# Patient Record
Sex: MALE | Employment: STUDENT | URBAN - METROPOLITAN AREA
[De-identification: names, ages, dates, MRNs, and addresses within clinical notes are randomized per-mention and may not be internally consistent; named-entity substitution may affect disease eponyms.]

---

## 2023-01-28 ENCOUNTER — OFFICE VISIT (OUTPATIENT)
Dept: ORTHOPEDIC SURGERY | Age: 43
End: 2023-01-28

## 2023-01-28 VITALS — BODY MASS INDEX: 26.52 KG/M2 | HEIGHT: 66 IN | WEIGHT: 165 LBS

## 2023-01-28 DIAGNOSIS — M54.2 NECK PAIN: ICD-10-CM

## 2023-01-28 DIAGNOSIS — M25.512 ACUTE PAIN OF LEFT SHOULDER: Primary | ICD-10-CM

## 2023-01-28 DIAGNOSIS — M54.12 CERVICAL RADICULAR PAIN: ICD-10-CM

## 2023-01-28 DIAGNOSIS — M50.30 DDD (DEGENERATIVE DISC DISEASE), CERVICAL: ICD-10-CM

## 2023-01-28 RX ORDER — TIZANIDINE 4 MG/1
4 TABLET ORAL 4 TIMES DAILY PRN
Qty: 60 TABLET | Refills: 0 | Status: SHIPPED | OUTPATIENT
Start: 2023-01-28

## 2023-01-28 RX ORDER — METHYLPREDNISOLONE 4 MG/1
TABLET ORAL
Qty: 1 KIT | Refills: 0 | Status: SHIPPED | OUTPATIENT
Start: 2023-01-28

## 2023-01-28 RX ORDER — IBUPROFEN 600 MG/1
600 TABLET ORAL EVERY 8 HOURS PRN
Qty: 90 TABLET | Refills: 1 | Status: SHIPPED | OUTPATIENT
Start: 2023-01-28 | End: 2023-03-29

## 2023-01-28 NOTE — PROGRESS NOTES
New Patient: CERVICAL SPINE    Referring Provider:  No ref. provider found    CHIEF COMPLAINT:    Chief Complaint   Patient presents with    New Patient     Left shoulder       HISTORY OF PRESENT ILLNESS:   Mr. Naveen Mccauley is a pleasant 43 y.o. old male here in the [address] [place] [address] for consultation regarding his neck and left arm pain. He states the pain began rather suddenly upon awakening about 1 month ago. His pain has steadily worsened since then. He rates his neck pain 810 VAS and shoulder and arm pain 8/10 VAS. He describes the pain as aching, throbbing from his lateral neck down to his thumb and index finger. Pain is worst with activity and improved some with change in position. The arm pain radiates to his left hand. He reports numbness and tingling in a C6 distribution. He reports weakness of his left arm. Patient denies difficulty with fine motor skills. He denies lower extremity symptoms, gait abnormality, saddle numbness and bowel or bladder dysfunction. The pain does interfere with his sleep. Current/Past Treatment:   Physical Therapy: None. Chiropractic: None. Injection: None. Medications: Over-the-counter anti-inflammatory medications without relief. Past Medical History:   No past medical history on file. Past Surgical History:     No past surgical history on file. Current Medications:     Current Outpatient Medications:     methylPREDNISolone (MEDROL, ADAM,) 4 MG tablet, Take by mouth. 6 po day one 5 po day 2 4 po day 3 3 po day 4 2 po day 5 1 po day 6., Disp: 1 kit, Rfl: 0    tiZANidine (ZANAFLEX) 4 MG tablet, Take 1 tablet by mouth 4 times daily as needed (spasm), Disp: 60 tablet, Rfl: 0    ibuprofen (ADVIL;MOTRIN) 600 MG tablet, Take 1 tablet by mouth every 8 hours as needed for Pain Do not start until Medrol dose pack completed, Disp: 90 tablet, Rfl: 1    Allergies:  Patient has no known allergies.     Social History: reports that he has been smoking cigarettes. He has never used smokeless tobacco.    Family History:   No family history on file. Review of Systems:  I have reviewed the clinically relevant past medical history, medications, allergies, family history, social history, and 13 point Review of Systems from the patient's recent history form & documented any details relevant to today's presenting complaints in the history above. The patient's self-reported past medical history, medications, allergies, family history, social history, and Review of Systems form from today's date have been scanned into the chart under the \"Media\" tab. PHYSICAL EXAM:    Vitals: Height 5' 6\" (1.676 m), weight 165 lb (74.8 kg). GENERAL EXAM:  General Apparence: Patient is adequately groomed with no evidence of malnutrition. Orientation: The patient is oriented to time, place and person. Mood & Affect:The patient's mood and affect are appropriate   Vascular: Examination reveals no swelling tenderness in upper or lower extremities. Good capillary refill  Lymphatic: The lymphatic examination bilaterally reveals all areas to be without enlargement or induration  Sensation: Sensation is intact without deficit  Coordination/Balance: Good coordination. Tandem walking normal.     CERVICAL EXAMINATION:  Inspection: Local inspection shows no step-off or bruising. Cervical alignment is normal.     Palpation: No evidence of tenderness at the midline, and trapezius. Paraspinal tenderness is present. There is no step-off or paraspinal spasm. Range of Motion: Cervical flexion, extension, and rotation are mildly reduced with pain. Strength: 5/5 bilateral upper extremities   Special Tests:     Spurling's negative & Carpenter's negative bilaterally. Cubital and Carpal tunnel Tinel's negative bilaterally. Skin:There are no rashes, ulcerations or lesions in right & left upper extremities.   Reflexes: Bilaterally triceps, biceps and brachioradialis are 2+. Clonus absent bilaterally at the feet. Gait & station: normal, patient ambulates without assistance. Additional Examinations:       RIGHT UPPER EXTREMITY:  Inspection/examination of the right upper extremity does not show any tenderness, deformity or injury. Range of motion is unremarkable. There is no gross instability. There are no rashes, ulcerations or lesions. Strength and tone are normal.  LEFT UPPER EXTREMITY: Inspection/examination of the left upper extremity does not show any tenderness, deformity or injury. Range of motion is unremarkable. There is no gross instability. There are no rashes, ulcerations or lesions. Strength and tone are normal.    Diagnostic Testing:  AP and lateral cervical spine x-rays as well as AP axillary and Y views of the left shoulder were obtained today. X-rays show moderate degenerative disc disease C5-6. Left shoulder x-rays unremarkable. 1. Acute pain of left shoulder    2. Neck pain    3. DDD (degenerative disc disease), cervical    4. Cervical radicular pain          Impression:   49-year-old gentleman who awoke with stiffness of the neck 1 month ago with progressive left upper extremity radicular symptoms. X-rays show degenerative disc disease at C5-C6. He reports numbness and tingling into the thumb and index finger. He is neurologically intact in the upper extremity. He has a normal left shoulder examination. I had an extensive discussion with Mr. Jones Desouza and/or family regarding the natural history, etiology, and long term consequences of his condition. I have presented reasonable alternatives to the patient's proposed care, treatment, and services. Risks and benefits of the treatment options also reviewed in detail. I have outlined a treatment plan with them. He has had full opportunity to ask his questions. I have answered them all to his satisfaction.   I feel that Mr. Jones Desouza understands our discussion today. Medications prescribed today:   Medrol Dosepak 4 mg tablets. Tizanidine 4 mg tablets to be taken every 8 hours for muscle spasm. Ibuprofen 600 mg 3 times daily to be started after Medrol Dosepak completed. PT-referral to outpatient therapy provided today. Further Imaging-consider cervical spine MRI if symptoms fail to improve with conservative treatment. Procedures-no surgical indication at this time. Follow up-3-4 weeks. Call or return to clinic if these symptoms worsen or fail to improve as anticipated. The total time spent on today's visit including reviewing test results, history, performance of physical exam, counseling/ education, ordering of medications, tests or procedures was 35 minutes. This time does include completion of the medical record. This time excludes any time spent performing procedures or tests in the office. Sari Huff PA-C   Senior Physician Assistant   [facility]                                         Disclaimer: This note was generated with use of a verbal recognition program (DRAGON) and an attempt was made to check for errors. It is possible that there are still dictated errors within this office note. If so, please bring any significant errors to my attention for an addendum. All efforts were made to ensure that this office note is accurate.

## 2023-02-13 ENCOUNTER — HOSPITAL ENCOUNTER (EMERGENCY)
Age: 43
Discharge: HOME | End: 2023-02-13
Payer: COMMERCIAL

## 2023-02-13 VITALS
HEART RATE: 80 BPM | RESPIRATION RATE: 16 BRPM | DIASTOLIC BLOOD PRESSURE: 74 MMHG | WEIGHT: 180 LBS | TEMPERATURE: 98.2 F | BODY MASS INDEX: 29.05 KG/M2 | SYSTOLIC BLOOD PRESSURE: 122 MMHG | OXYGEN SATURATION: 99 %

## 2023-02-13 DIAGNOSIS — M54.2 NECK PAIN: Primary | ICD-10-CM

## 2023-02-13 PROCEDURE — 2500000003 HC RX 250 WO HCPCS: Performed by: PHYSICIAN ASSISTANT

## 2023-02-13 PROCEDURE — 6360000002 HC RX W HCPCS: Performed by: PHYSICIAN ASSISTANT

## 2023-02-13 PROCEDURE — 6370000000 HC RX 637 (ALT 250 FOR IP): Performed by: PHYSICIAN ASSISTANT

## 2023-02-13 PROCEDURE — 96372 THER/PROPH/DIAG INJ SC/IM: CPT

## 2023-02-13 PROCEDURE — 99284 EMERGENCY DEPT VISIT MOD MDM: CPT

## 2023-02-13 RX ORDER — BUPIVACAINE HYDROCHLORIDE 5 MG/ML
30 INJECTION, SOLUTION EPIDURAL; INTRACAUDAL ONCE
Status: COMPLETED | OUTPATIENT
Start: 2023-02-13 | End: 2023-02-13

## 2023-02-13 RX ORDER — HYDROCODONE BITARTRATE AND ACETAMINOPHEN 5; 325 MG/1; MG/1
1 TABLET ORAL ONCE
Status: COMPLETED | OUTPATIENT
Start: 2023-02-13 | End: 2023-02-13

## 2023-02-13 RX ORDER — LIDOCAINE 4 G/G
1 PATCH TOPICAL ONCE
Status: DISCONTINUED | OUTPATIENT
Start: 2023-02-13 | End: 2023-02-13 | Stop reason: HOSPADM

## 2023-02-13 RX ORDER — TRIAMCINOLONE ACETONIDE 40 MG/ML
80 INJECTION, SUSPENSION INTRA-ARTICULAR; INTRAMUSCULAR ONCE
Status: COMPLETED | OUTPATIENT
Start: 2023-02-13 | End: 2023-02-13

## 2023-02-13 RX ORDER — LIDOCAINE 4 G/G
1 PATCH TOPICAL DAILY
Qty: 30 PATCH | Refills: 0 | Status: SHIPPED | OUTPATIENT
Start: 2023-02-13 | End: 2023-03-15

## 2023-02-13 RX ADMIN — BUPIVACAINE HYDROCHLORIDE 150 MG: 5 INJECTION, SOLUTION EPIDURAL; INTRACAUDAL at 13:05

## 2023-02-13 RX ADMIN — TRIAMCINOLONE ACETONIDE 80 MG: 40 INJECTION, SUSPENSION INTRA-ARTICULAR; INTRAMUSCULAR at 13:05

## 2023-02-13 RX ADMIN — HYDROCODONE BITARTRATE AND ACETAMINOPHEN 1 TABLET: 5; 325 TABLET ORAL at 12:06

## 2023-02-13 ASSESSMENT — ENCOUNTER SYMPTOMS
NAUSEA: 0
SHORTNESS OF BREATH: 0
VOMITING: 0
COLOR CHANGE: 0
COUGH: 0

## 2023-02-13 ASSESSMENT — PAIN SCALES - GENERAL
PAINLEVEL_OUTOF10: 9
PAINLEVEL_OUTOF10: 6
PAINLEVEL_OUTOF10: 6
PAINLEVEL_OUTOF10: 10

## 2023-02-13 ASSESSMENT — PAIN DESCRIPTION - LOCATION: LOCATION: NECK

## 2023-02-13 ASSESSMENT — PAIN - FUNCTIONAL ASSESSMENT: PAIN_FUNCTIONAL_ASSESSMENT: 0-10

## 2023-02-13 ASSESSMENT — PAIN DESCRIPTION - ORIENTATION: ORIENTATION: LEFT

## 2023-02-13 NOTE — Clinical Note
Felisa Cooks was seen and treated in our emergency department on 2/13/2023. He may return to work on 02/15/2023. LIGHT DUTY x 2 weeks; no lifting/pushing/pulling anything greater than 5 lbs until cleared by orthopedics. If you have any questions or concerns, please don't hesitate to call.       Tee Escobedo

## 2023-02-13 NOTE — ED PROVIDER NOTES
[address]        Pt Name: Zahraa Ford  MRN: [id number]  [place] 1980  Date of evaluation: 2/13/2023  Provider: CARLOS Dockery  PCP: PROVIDER Jonny Fine MD  Note Started: 1:08 PM EST 2/13/23      MARCO. I have evaluated this patient. My supervising physician was available for consultation. CHIEF COMPLAINT       Chief Complaint   Patient presents with    Neck Pain     Neck pain for two months. Left side. Denies any other pain. HISTORY OF PRESENT ILLNESS: 1 or more Elements     History From: patient  Limitations to history : None    Zahraa Ford is a 43 y.o. male who presents to the emergency department today with complaints of left-sided neck pain. Patient reports that the symptoms has been present for the last 2 months. The symptoms have been getting worse. He did see a PA at orthopedics, and was started on relaxers and steroids. He states that they helped very briefly, but did not completely save the symptoms. Since stopping his medications his symptoms have returned with a vengeance. Last night he went to [facility] emergency room due to the severity of the pain. He presents today for further evaluation and management. He states that he has not yet tried calling the PA he saw with orthopedics, he has an appointment for follow-up next week. He has no further complaints. Nursing Notes were all reviewed and agreed with or any disagreements were addressed in the HPI. REVIEW OF SYSTEMS :      Review of Systems   Constitutional:  Negative for chills and fever. Respiratory:  Negative for cough and shortness of breath. Cardiovascular:  Negative for chest pain and palpitations. Gastrointestinal:  Negative for nausea and vomiting. Musculoskeletal:  Positive for myalgias and neck pain. Skin:  Negative for color change and rash. Neurological:  Negative for syncope and weakness. Psychiatric/Behavioral:  Negative for agitation and confusion. Positives and Pertinent negatives as per HPI. SURGICAL HISTORY   No past surgical history on file. [address]       Discharge Medication List as of 2/13/2023  1:18 PM        CONTINUE these medications which have NOT CHANGED    Details   methylPREDNISolone (MEDROL, ADAM,) 4 MG tablet Take by mouth. 6 po day one 5 po day 2 4 po day 3 3 po day 4 2 po day 5 1 po day 6., Disp-1 kit, R-0Normal      tiZANidine (ZANAFLEX) 4 MG tablet Take 1 tablet by mouth 4 times daily as needed (spasm), Disp-60 tablet, R-0Normal      ibuprofen (ADVIL;MOTRIN) 600 MG tablet Take 1 tablet by mouth every 8 hours as needed for Pain Do not start until Medrol dose pack completed, Disp-90 tablet, R-1Normal             ALLERGIES     Patient has no known allergies. FAMILYHISTORY     No family history on file. SOCIAL HISTORY       Social History     Tobacco Use    Smoking status: Every Day     Types: Cigarettes    Smokeless tobacco: Never       SCREENINGS        Manny Coma Scale  Eye Opening: Spontaneous  Best Verbal Response: Oriented  Best Motor Response: Obeys commands  Pepin Coma Scale Score: 15                CIWA Assessment  BP: 122/74  Heart Rate: 80           PHYSICAL EXAM  1 or more Elements     ED Triage Vitals   BP Temp Temp Source Heart Rate Resp SpO2 Height Weight   02/13/23 1131 02/13/23 1131 02/13/23 1131 02/13/23 1131 02/13/23 1131 02/13/23 1131 -- 02/13/23 1132   126/81 98.2 °F (36.8 °C) Oral 89 17 98 %  180 lb (81.6 kg)       Physical Exam  Vitals and nursing note reviewed. Constitutional:       General: He is not in acute distress. Appearance: Normal appearance. He is well-developed. He is not ill-appearing, toxic-appearing or diaphoretic. HENT:      Head: Normocephalic and atraumatic. Mouth/Throat:      Mouth: Mucous membranes are moist.      Pharynx: Oropharynx is clear.    Eyes:      Conjunctiva/sclera: Conjunctivae normal.      Pupils: Pupils are equal, round, and reactive to light. Neck:     Cardiovascular:      Pulses: Normal pulses. Pulmonary:      Effort: Pulmonary effort is normal. No respiratory distress. Breath sounds: Normal breath sounds. Musculoskeletal:      Cervical back: Tenderness present. No rigidity or crepitus. Pain with movement present. Skin:     General: Skin is warm and dry. Neurological:      Mental Status: He is alert. Psychiatric:         Behavior: Behavior normal. Behavior is cooperative. DIAGNOSTIC RESULTS   LABS:    Labs Reviewed - No data to display    When ordered only abnormal lab results are displayed. All other labs were within normal range or not returned as of this dictation. EKG: When ordered, EKG's are interpreted by the Emergency Department Physician in the absence of a cardiologist.  Please see their note for interpretation of EKG. RADIOLOGY:   Non-plain film images such as CT, Ultrasound and MRI are read by the radiologist. Plain radiographic images are visualized and preliminarily interpreted by the ED Provider with the below findings:    Interpretation per the Radiologist below, if available at the time of this note:    No orders to display     No results found. No results found. PROCEDURES   Unless otherwise noted below, none     Procedures    PAST MEDICAL HISTORY      has no past medical history on file.      EMERGENCY DEPARTMENT COURSE and DIFFERENTIAL DIAGNOSIS/MDM:   Vitals:    Vitals:    02/13/23 1131 02/13/23 1132 02/13/23 1315   BP: 126/81  122/74   Pulse: 89  80   Resp: 17  16   Temp: 98.2 °F (36.8 °C)     TempSrc: Oral     SpO2: 98%  99%   Weight:  180 lb (81.6 kg)        Patient was given the following medications:  Medications   HYDROcodone-acetaminophen (NORCO) 5-325 MG per tablet 1 tablet (1 tablet Oral Given 2/13/23 1206)   triamcinolone acetonide (KENALOG-40) injection 80 mg (80 mg IntraMUSCular Given 2/13/23 1305)   bupivacaine (PF) (MARCAINE) 0.5 % injection 150 mg (150 mg IntraDERmal Given by Other 2/13/23 0055)             Is this patient to be included in the SEP-1 Core Measure due to severe sepsis or septic shock? No   Exclusion criteria - the patient is NOT to be included for SEP-1 Core Measure due to: Infection is not suspected    Chronic Conditions affecting care:    has no past medical history on file. CONSULTS: (Who and What was discussed)  IP CONSULT TO ORTHOPEDIC SURGERY      Social Determinants Significantly Affecting Health : None    Records Reviewed (External and Source) EMR    CC/HPI Summary, DDx, ED Course, and Reassessment: This is a 43year old male ho presents to the emergency department today with complaints of left-sided neck pain. Patient reports that the symptoms has been present for the last 2 months. The symptoms have been getting worse. He did see a PA at orthopedics, and was started on relaxers and steroids. He states that they helped very briefly, but did not completely save the symptoms. Since stopping his medications his symptoms have returned with a vengeance. Last night he went to [facility] emergency room due to the severity of the pain. He presents today for further evaluation and management. He states that he has not yet tried calling the PA he saw with orthopedics, he has an appointment for follow-up next week. - Vital signs were reviewed. Exam as above. - Pertinent Labs & Imaging studies reviewed. (See chart for details)   -  Patient seen and evaluated in the emergency department. -  Triage and nursing notes reviewed and incorporated. -  Old chart records reviewed and incorporated. -  MARCO. I have evaluated this patient. My supervising physician was available for consultation.  -  Differential diagnosis includes: Radiculopathy, degenerative changes, arthritis, muscle spasm, other  -  Work-up included:  See above  -  ED treatment included: Norco, lidocaine patch.   Was also treated in the emergency department with a trigger point injection performed by orthopedic NP Nell. - Consults: Ortho - spoke with Adamaris Marrero, who kindly came to the emergency department today to evaluate the patient, and perform a trigger point injection which did provide some improvement to his symptoms. She also was able to facilitate communication between the PA that saw him previously to arrange for short interval follow-up, and possible arrangement of an MRI in the interim before she follows up with CARLOS Awan.  -  Results discussed with patient and/or family. Patient feels improvement. At this time, we recommend discharge, as the patient is stable for outpatient management. The patient and/or family is agreeable with plan of care and disposition.  -  Disposition: Home in stable can  - Critical Care:  0 minutes      FINAL IMPRESSION      1. Neck pain          DISPOSITION/PLAN     DISPOSITION Decision To Discharge 02/13/2023 01:09:07 PM      PATIENT REFERRED TO:  Zuleika Bazan, [facility].   [facility] [address] [place] [address]  [phone]    Schedule an appointment as soon as possible for a visit       [facility] Emergency Department  [address] [place] [address]  [phone]    If symptoms worsen      DISCHARGE MEDICATIONS:  Discharge Medication List as of 2/13/2023  1:18 PM        START taking these medications    Details   lidocaine 4 % external patch Place 1 patch onto the skin daily, TransDERmal, DAILY Starting Mon 2/13/2023, Until Wed 3/15/2023, For 30 days, Disp-30 patch, R-0, Normal             DISCONTINUED MEDICATIONS:  Discharge Medication List as of 2/13/2023  1:18 PM                 (Please note that portions of this note were completed with a voice recognition program.  Efforts were made to edit the dictations but occasionally words are mis-transcribed.)    CARLOS Keith (electronically signed)        Tee Keith  02/13/23 2100

## 2023-02-13 NOTE — DISCHARGE INSTRUCTIONS
Call CARLOS Mas's office today to make an appt for tomorrow to arrange for MRI    Light duty at work until cleared by [employer]    Return with any new or worrisome symptoms

## 2023-02-14 ENCOUNTER — TELEPHONE (OUTPATIENT)
Dept: ORTHOPEDIC SURGERY | Age: 43
End: 2023-02-14

## 2023-02-14 DIAGNOSIS — M54.12 CERVICAL RADICULAR PAIN: Primary | ICD-10-CM

## 2023-02-14 NOTE — TELEPHONE ENCOUNTER
General Question     Subject: patient call and would like to know if he can get his Mri Order put in so he can get that schedule at the [facility] he would like a call back when that is done so he can schedule.  Please Advise   Patient Jay Mccarthy  Contact Number: [phone]

## 2023-02-21 ENCOUNTER — OFFICE VISIT (OUTPATIENT)
Dept: ORTHOPEDIC SURGERY | Age: 43
End: 2023-02-21
Payer: COMMERCIAL

## 2023-02-21 VITALS — WEIGHT: 180 LBS | BODY MASS INDEX: 28.93 KG/M2 | HEIGHT: 66 IN | RESPIRATION RATE: 16 BRPM

## 2023-02-21 DIAGNOSIS — M54.12 CERVICAL RADICULOPATHY AT C6: ICD-10-CM

## 2023-02-21 DIAGNOSIS — M50.30 DDD (DEGENERATIVE DISC DISEASE), CERVICAL: Primary | ICD-10-CM

## 2023-02-21 PROCEDURE — 99214 OFFICE O/P EST MOD 30 MIN: CPT | Performed by: PHYSICIAN ASSISTANT

## 2023-02-21 RX ORDER — IBUPROFEN 600 MG/1
600 TABLET ORAL EVERY 8 HOURS PRN
Qty: 90 TABLET | Refills: 1 | Status: SHIPPED | OUTPATIENT
Start: 2023-02-21 | End: 2023-04-22

## 2023-02-21 RX ORDER — GABAPENTIN 100 MG/1
100 CAPSULE ORAL 3 TIMES DAILY
Qty: 90 CAPSULE | Refills: 0 | Status: SHIPPED | OUTPATIENT
Start: 2023-02-21 | End: 2023-03-23

## 2023-02-21 RX ORDER — TIZANIDINE 4 MG/1
4 TABLET ORAL 4 TIMES DAILY PRN
Qty: 60 TABLET | Refills: 0 | Status: SHIPPED | OUTPATIENT
Start: 2023-02-21

## 2023-02-21 NOTE — PROGRESS NOTES
Subjective:      Patient ID: John Beyer is a 43 y.o. male who is here for follow up evaluation of left arm radicular pain. Scot Hyde He was initially seen at [facility] orthopedic after-hours clinic by myself on 1/28/2023 with chief complaint of left shoulder pain. At that time felt to be cervical radicular nature. He was prescribed Zanaflex for muscle spasm, Medrol Dosepak and was referred to physical therapy. Since that visit he has been to 2 different ERs for increased left posterior shoulder pain, left arm pain and numbness and tingling left upper extremity whole hand. He reports no significant improvement with the oral steroids and muscle relaxer. He has not attempted therapy due to work schedule. He did receive a trigger point steroid injection in the left trapezial region on 2/13/2023 with mild temporary relief. Review Of Systems:   Negative for fever or chills. Reports weakness in left upper extremity. Denies gait or balance disturbance. History reviewed. No pertinent past medical history. History reviewed. No pertinent family history. History reviewed. No pertinent surgical history. Social History     Occupational History    Not on file   Tobacco Use    Smoking status: Every Day     Types: Cigarettes    Smokeless tobacco: Never   Substance and Sexual Activity    Alcohol use: Not on file    Drug use: Not on file    Sexual activity: Not on file       Current Outpatient Medications   Medication Sig Dispense Refill    tiZANidine (ZANAFLEX) 4 MG tablet Take 1 tablet by mouth 4 times daily as needed (spasm) 60 tablet 0    gabapentin (NEURONTIN) 100 MG capsule Take 1 capsule by mouth 3 times daily for 30 days. Intended supply: 30 days 90 capsule 0    ibuprofen (ADVIL;MOTRIN) 600 MG tablet Take 1 tablet by mouth every 8 hours as needed for Pain 90 tablet 1    lidocaine 4 % external patch Place 1 patch onto the skin daily 30 patch 0    methylPREDNISolone (MEDROL, ADAM,) 4 MG tablet Take by mouth. 6 po day one 5 po day 2 4 po day 3 3 po day 4 2 po day 5 1 po day 6. 1 kit 0     No current facility-administered medications for this visit. Objective:     He is alert, oriented x 3, pleasant, well nourished, developed and in no   acute distress. Resp 16   Ht 5' 6\" (1.676 m)   Wt 180 lb (81.6 kg)   BMI 29.05 kg/m²      Cervical Spine Exam:  There is not deformity. There is  loss of motion. There is  muscular spasm. There is  trapezius/ rhomboid tenderness. There is not spinous process tenderness. Spurling Test is Positive. Upper extremities:  He has weakness 4/5 of the left upper extremity including left  strength testing, wrist extensor and shoulder abductor. DTR 2+ at the biceps, triceps and brachioradialis. He has no clonus and negative Carpenter's bilaterally. Negative Tinel's at the left wrist.  Negative Tinel's at the left elbow. Examination of the upper extremities shows intact perfusion to all extremities. He has no cyanosis and digigts are warm to touch, capillary refill is less than 2 seconds. He has no edema noted. He has intact skin without lacerations or abrasions, no significant erythema, rashes or skin lesions. X Rays: not performed in the office today:   X-ray cervical spine reviewed from 1/28/2023. Moderate degenerative disc disease at C5-C6. Mild degenerative disc disease at C4-C5 as well as C6-C7. Diagnosis:       ICD-10-CM    1. DDD (degenerative disc disease), cervical  M50.30 Ambulatory referral to Physical Therapy      2. Cervical radiculopathy at C6  M54.12 Ambulatory referral to Physical Therapy           Assessment and Plan:       Assessment: This is a 55-year-old male with a left upper extremity cervical radiculopathy and x-rays showing moderate degenerative disc disease at C5-6. Mild degenerative changes at C4-5 and C6-7. No improvement with recent Medrol Dosepak. Minimal improvement with previous trigger point injection.   He has been to the ER on 2 occasions since my initial visit for continued left arm pain. I had an extensive discussion with Mr. Nicole Guevara regarding the natural history, etiology, and long term consequences of his condition. I have presented reasonable alternatives to the patient's proposed care, treatment, and services. Risks and benefits of the treatment options also reviewed in detail. I have outlined a treatment plan with them. He has had full opportunity to ask his questions. I have answered them all to his satisfaction. I feel that Mr. Nicole Guevara understands our discussion today. Plan:  Medications-   We will add gabapentin 100 mg may titrate up to 3 times daily. Refilled tizanidine and ibuprofen. PT-physical therapy for cervical traction and modalities recommended. Another PT Rx provided today. Further Imaging-MRI will be obtained of the cervical spine to evaluate for canal or foraminal stenosis causing left upper extremity radiculopathy. Follow up-after MRI to review test results. Call or return to clinic if these symptoms worsen or fail to improve as anticipated. The total time spent on today's visit including reviewing test results, history, performance of physical exam, counseling/ education, ordering of medications, tests or procedures was 33 minutes. This time does include completion of the medical record. This time excludes any time spent performing procedures or tests in the office. Joe Lynn PA-C   Senior Physician Assistant   [facility]                                         Disclaimer: This note was generated with use of a verbal recognition program (DRAGON) and an attempt was made to check for errors. It is possible that there are still dictated errors within this office note. If so, please bring any significant errors to my attention for an addendum.   All efforts were made to ensure that this office note is accurate.

## 2023-03-06 ENCOUNTER — HOSPITAL ENCOUNTER (OUTPATIENT)
Dept: PHYSICAL THERAPY | Age: 43
Setting detail: THERAPIES SERIES
Discharge: HOME | End: 2023-03-06
Payer: COMMERCIAL

## 2023-03-06 PROCEDURE — 97161 PT EVAL LOW COMPLEX 20 MIN: CPT

## 2023-03-06 PROCEDURE — 97530 THERAPEUTIC ACTIVITIES: CPT

## 2023-03-06 PROCEDURE — 97140 MANUAL THERAPY 1/> REGIONS: CPT

## 2023-03-06 NOTE — FLOWSHEET NOTE
[address] [facility] [address] Jamal Cordero [address]  Phone: [phone]   Fax:     [phone]    Physical Therapy Treatment Note/ Progress Report:     Date:  3/6/2023    Patient Name:  Spenser Dyer    :  1980  MRN: [id number]    Pertinent Medical History:      Medical/Treatment Diagnosis Information:  Diagnosis: M50.30 (ICD-10-CM) - DDD (degenerative disc disease), cervical.  M54.12 (ICD-10-CM) - Cervical radiculopathy at C6  Treatment Diagnosis: Pain with radiating symptoms. Left UE weakness. Left UE paresthesias. Decrease cervical AROM    Insurance/Certification information:  PT Insurance Information: [employer] [place] PPO- 20 visits, 25%, 0$. No Auth  Physician Information:  Referring Provider (secondary): CARLOS Devries  Plan of care signed (Y/N):     Date of Patient follow up with Physician:      Progress Report: []  Yes  [x]  No     Date Range for reporting period:  Beginning: 3/6/2023  Ending:     Progress report due (10 Rx/or 30 days whichever is less):      Recertification due (POC duration/ or 90 days whichever is less): 23     Visit # Insurance/POC Allowable Auth Needed    []Yes    [x]No     Functional Outcomes Measure:    Date Assessed: at eval  Test: NDI  Score:45    Pain level:  9-10/10     HISTORY OF INJURY:  Patient stated complaint: Pt states about 3 months ago he woke up with a kink in his neck and then it became worse, he couldn't lay on his left side or flat on his back. States pain kept increasing, states then super bowl night he had numbness down his left arm to his hand. States ER didn't do much, has been taking steps since then. States numbness lasted about 2-3 hours. States he had MRI today. States he still gets some tingling in his left arm that goes to this 1st and 2nd finger but doesn't get numbness. States he has noticed left hand weakness.  States he gets pain in his left upper arm to his elbow in the muscle and around the back of his left shoulder blade and in his neck on the left. States he has been getting headaches the past two weeks, states he isn't getting much relief at all. States when he lifts his left arm over his head it helps the pain some. States the gabapentin is helping some with the pain. Pain 9-10/10    SUBJECTIVE:  See eval    OBJECTIVE:   Observation:   Test measurements:      Assessment:  *seems to be C6 cervical radiculopathy causing symptoms-pt has some myotomal and dermatomal deficits    Plan:  Perform UE DTR and Hoffmans  Continue cervical traction  Cervical ROM and strengthening exericses    RESTRICTIONS/PRECAUTIONS:     Exercises/Interventions:   Therapeutic Ex (06277)  Min: Resistance/Repetitions Notes                                           Manual Intervention (62960)  Min:     Cerv mobs/manip Cervical Sideglides  Cervical Traction Tingling gone in left UE after    Thoracic mobs/manip     CT manip     Rib mobilizations     STM Cervical Paraspinals         NMR re-education (67901)  Min:               Therapeutic Activity (46422)  Min:               Modalities  Min:                HEP     Cervical Rotation AROM  UT Stretch- Left  Chin Tuck  3/6/23               Other Therapeutic Activities:    Pt was educated on PT POC, Diagnosis, Prognosis, pathomechanics as well as frequency and duration of scheduling future physical therapy appointments. Time was also taken on this day to answer all patient questions and participation in PT. Reviewed appointment policy in detail with patient and patient verbalized understanding. Home Exercise Program:  Instructed patient on an HEP. Patient demonstrated exercises correctly. Handout with pictures and # of reps/sets was given. Exercises are listed above.     Therapeutic Exercise and NMR EXR  [x] (53283) Provided verbal/tactile cueing for activities related to strengthening, flexibility, endurance, ROM  for improvements in cervical, postural, scapular, scapulothoracic and UE control with self care, reaching, carrying, lifting, house/yardwork, driving/computer work.    [] (86283) Provided verbal/tactile cueing for activities related to improving balance, coordination, kinesthetic sense, posture, motor skill, proprioception  to assist with cervical, scapular, scapulothoracic and UE control with self care, reaching, carrying, lifting, house/yardwork, driving/computer work. Therapeutic Activities:    [x] (84508 or 52907) Provided verbal/tactile cueing for activities related to improving balance, coordination, kinesthetic sense, posture, motor skill, proprioception and motor activation to allow for proper function of cervical, scapular, scapulothoracic and UE control with self care, carrying, lifting, driving/computer work.      Home Exercise Program:    [x] (14249) Reviewed/Progressed HEP activities related to strengthening, flexibility, endurance, ROM of cervical, scapular, scapulothoracic and UE control with self care, reaching, carrying, lifting, house/yardwork, driving/computer work  [] (02569) Reviewed/Progressed HEP activities related to improving balance, coordination, kinesthetic sense, posture, motor skill, proprioception of cervical, scapular, scapulothoracic and UE control with self care, reaching, carrying, lifting, house/yardwork, driving/computer work      Manual Treatments:  PROM / STM / Oscillations-Mobs:  G-I, II, III, IV (PA's, Inf., Post.)  [x] (83579) Provided manual therapy to mobilize soft tissue/joints of cervical/CT, scapular GHJ and UE for the purpose of decreasing headache, modulating pain, promoting relaxation,  increasing ROM, reducing/eliminating soft tissue swelling/inflammation/restriction, improving soft tissue extensibility and allowing for proper ROM for normal function with self care, reaching, carrying, lifting, house/yardwork, driving/computer work    Amanda Burger Please Indicate Time In/Out  CPT Code Time in Time out                                   Approval Dates:  CPT Code Units Approved Units Used  Date Updated:                     Charges:  Timed Code Treatment Minutes: 25   Total Treatment Minutes: 49     [x] EVAL (LOW) 94529 (typically 20 minutes face-to-face)  [] EVAL (MOD) 79499 (typically 30 minutes face-to-face)  [] EVAL (HIGH) 91344 (typically 45 minutes face-to-face)  [] RE-EVAL     [] LZ(57248) x     [] Dry needle 1 or 2 Muscles (99811)  [] NMR (24065) x     [] Dry needle 3+ Muscles (51851)  [x] Manual (19895) x     [] Ultrasound (02025) x  [x] TA (42068) x     [] Mech Traction (57393)  [] ES(attended) (59881)     [] ES (un) (29204):   [] Vasopump (93689) [] Ionto (60653)   [] Other:    Tania Bowman stated goal: Get better  [] Progressing: [] Met: [] Not Met: [] Adjusted     Therapist goals for Patient:   Short Term Goals: To be achieved in: 2 weeks  1. Independent in HEP and progression per patient tolerance, in order to prevent re-injury. [] Progressing: [] Met: [] Not Met: [] Adjusted  2. Patient will have a decrease in pain to facilitate improvement in movement, function, and ADLs as indicated by Functional Deficits. [] Progressing: [] Met: [] Not Met: [] Adjusted     Long Term Goals: To be achieved in: 8 weeks  1. Increase FOTO functional outcome score from 45 to 22  to assist with reaching prior level of function. [] Progressing: [] Met: [] Not Met: [] Adjusted  2. Patient will demonstrate increased AROM to [facility] for cervical spine without pain  [] Progressing: [] Met: [] Not Met: [] Adjusted  3. Patient will demonstrate an increase in strength in left UE to 5/5 in all myotomes and no dermatomal deficits in left UE   [] Progressing: [] Met: [] Not Met: [] Adjusted  4. Patient will return to normal daily and work functional activities without increased symptoms or restriction. Pain <3/10 and no radicular symptoms  [] Progressing: [] Met: [] Not Met: [] Adjusted  5.  Pt will report <3/10 pain and no radicular symptoms (patient specific functional goal)    [] Progressing: [] Met: [] Not Met: [] Adjusted            ASSESSMENT:  See above    Treatment/Activity Tolerance:  [x] Patient tolerated treatment well [] Patient limited by fatique  [] Patient limited by pain  [] Patient limited by other medical complications  [] Other:     Overall Progression Towards Functional goals/ Treatment Progress Update:  [] Patient is progressing as expected towards functional goals listed. [] Progression is slowed due to complexities/Impairments listed. [] Progression has been slowed due to co-morbidities. [x] Plan just implemented, too soon to assess goals progression <30days   [] Goals require adjustment due to lack of progress  [] Patient is not progressing as expected and requires additional follow up with physician  [] Other    Prognosis for POC: [x] Good [] Fair  [] Poor    Patient requires continued skilled intervention: [x] Yes  [] No        PLAN: See above  [] Continue per plan of care [] Alter current plan (see comments)  [x] Plan of care initiated [] Hold pending MD visit [] Discharge    Electronically signed by: Kendall Brannon, PT   Kendall Brannon PT, DPT, OMT-C    Note: If patient does not return for scheduled/recommended follow up visits, this note will serve as a discharge from care along with the most recent update on progress.

## 2023-03-06 NOTE — PLAN OF CARE
\[facility]\"" - Outpatient Rehabilitation & Therapy  [address] [facility] [place] [address]  Phone: [phone]   Fax:     [phone]      Physical Therapy Certification    Dear Anthony Mas PA,    We had the pleasure of evaluating the following patient for physical therapy services at [facility].  A summary of our findings can be found in the initial assessment below.  This includes our plan of care.  If you have any questions or concerns regarding these findings, please do not hesitate to contact me at the office phone number checked above.  Thank you for the referral.       Physician Signature:_______________________________Date:__________________  By signing above (or electronic signature), therapist’s plan is approved by physician      Patient: Esequiel Richmond   : 1980   MRN: [id number]  Referring Physician: Anthony Mas PA      Evaluation Date: 2023      Medical Diagnosis Information:  Medical Diagnosis: DDD (degenerative disc disease), cervical [M50.30]  Cervical radiculopathy at C6 [M54.12]   Treatment Diagnosis: Pain with radiating symptoms. Left UE weakness. Left UE paresthesias. Decrease cervical AROM                                    Insurance information: PT Insurance Information: [facility] [place] PPO- 20 visits, 25%, 0$. No Auth    Precautions/ Contra-indications: Cervical radicular pain with myotome and dermatome deficits  Latex Allergy:  [x]NO      []YES  Preferred Language for Healthcare:   [x]English       []other:    C-SSRS Triggered by Intake questionnaire (Past 2 wk assessment ):   [x] No, Questionnaire did not trigger screening.   [] Yes, Patient intake triggered C-SSRS Screening      [] C-SSRS Screening completed  [] PCP notified via Epic     SUBJECTIVE: Patient stated complaint: Pt states about 3 months ago he woke up with a kink in his neck and then it became worse, he couldn't  lay on his left side or flat on his back. States pain kept increasing, states then super bowl night he had numbness down his left arm to his hand. States ER didn't do much, has been taking steps since then. States numbness lasted about 2-3 hours. States he had MRI today. States he still gets some tingling in his left arm that goes to this 1st and 2nd finger but doesn't get numbness. States he has noticed left hand weakness. States he gets pain in his left upper arm to his elbow in the muscle and around the back of his left shoulder blade and in his neck on the left. States he has been getting headaches the past two weeks, states he isn't getting much relief at all. States when he lifts his left arm over his head it helps the pain some. States the gabapentin is helping some with the pain. 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     Living Status/Prior Level of Function: Independent with ADLs and IADLs,     OBJECTIVE:   Posture: fair    Functional Mobility/Transfers: limited cervical AROM    Palpation: mild to moderate tenderness at C6 area on left- caused some tingling down left arm with palpation or sideglide at this level    Bandages/Dressings/Incisions: NA    Gait: (include devices/WB status):  WNL     CERV ROM     Cervical Flexion WNL    Cervical Extension Limited 25% due to pain     Left Right   Cervical SB WNL WNL *mild pain   Cervical rotation Limited 15% *pain Limited 15% *pain   Quadrant     Dorsal Glide      UE ROM Left Right   Shoulder Flex Shoulder AROM WNL *pain at end range Shoulder AROM WNL   Shoulder Abd     Shoulder ER     Shoulder IR     Elbow flex/ext     Wrist flex/ext/pro/sup Finger flex/ext/opposition     Shoulder AROM WNL w OP     UE Strength  Left Right   Shoulder Flex 4-/5 UE grossly WNL   Shoulder Scap     Shoulder ABd (C5 Axillary)     Shoulder ER      Shoulder IR     Elbow Flex (C5 Musc) 4/5    Elbow Ext (C7 Radial) 5/5    Wrist Flex (C6 Radial)     Wrist Ext (C7 Radial)     Finger flex (C8 median)     Finger ext (C7 Radial-PIN)     APB (T1 Median) 5/5    Finger Abd (T1 Ulnar) 5/5     25# 95#      Reflexes Normal Abnormal Comments               S1-2 Seated achilles [] []    S1-2 Prone knee bend [] []    L3-4 Patellar tendon [] []    C5-6 Biceps [] []    C6 Brachioradialis [] []    C7-8 Triceps [] []      Reflexes/Sensation:    []Dermatomes/Myotomes intact    []Reflexes equal and normal bilaterally   [x]Other: Light touch limited in left C6 dermatome- less feeling in thumb and paresthesia in lateral forearm    Joint mobility: Cervical spine   [x]Normal    []Hypo   []Hyper    Neurodynamics:   See above: deficits with myotome C5/6 and dermatome of C6    Orthopedic Special Tests:      Cluster Testing  Normal Abnormal N/A Comments   Babinski Test [] [] []    Carpenter Test [] [] []    Inverted Sup Sign [] [] []    Alar Ligament Test [] [] []    Transverse Ligament Test [] [] []    Sharp-Sandra Test [] [] []    Hautards Test [] [] []    Vertebral Artery Test [] [] []             Neural dynamic/ Tension testing Normal Abnormal N/A Comments   Spurling Maneuver:  [] [x] [] Reproduced pain in neck and shoulder blade   Distraction testing: [] [x] [] Improved tingling in left UE, sensation did not change after traction   ULNT [] [] []    Shoulder Abd testing  [] [] []    Cerv Rot/Lat Flex- 1st Rib [] [] []    Deep Neck Flex/endurance testing [] [] []    Craniocerv Flex testing Mena Murillo [] [] []    End Range Tolerance testing. [] [] []     [] [] []                           [x] Patient history, allergies, meds reviewed. Medical chart reviewed. See intake form.      Review Of Systems (ROS):  [x]Performed Review of systems (Integumentary, CardioPulmonary, Neurological) by intake and observation. Intake form has been scanned into medical record. Patient has been instructed to contact their primary care physician regarding ROS issues if not already being addressed at this time. Co-morbidities/Complexities (which will affect course of rehabilitation): see above  []None           Arthritic conditions   []Rheumatoid arthritis (M05.9)  []Osteoarthritis (M19.91)   Cardiovascular conditions   []Hypertension (I10)  []Hyperlipidemia (E78.5)  []Angina pectoris (I20)  []Atherosclerosis (I70)  []CVA Musculoskeletal conditions   []Disc pathology   []Congenital spine pathologies   []Prior surgical intervention  []Osteoporosis (M81.8)  []Osteopenia (M85.8)   Endocrine conditions   []Hypothyroid (E03.9)  []Hyperthyroid Gastrointestinal conditions   []Constipation (D64.81)   Metabolic conditions   []Morbid obesity (E66.01)  []Diabetes type 1(E10.65) or 2 (E11.65)   []Neuropathy (G60.9)     Pulmonary conditions   []Asthma (J45)  []Coughing   []COPD (J44.9)   Psychological Disorders  []Anxiety (F41.9)  []Depression (F32.9)   []Other:   []Other:          Barriers to/and or personal factors that will affect rehab potential:              []Age  []Sex   []Smoker              []Motivation/Lack of Motivation                        []Co-Morbidities              []Cognitive Function, education/learning barriers              []Environmental, home barriers              []profession/work barriers  []past PT/medical experience  []other:  Justification:     Falls Risk Assessment (30 days):   [x] Falls Risk assessed and no intervention required.   [] Falls Risk assessed and Patient requires intervention due to being higher risk   TUG score (>12s at risk):     [] Falls education provided, including     ASSESSMENT: see daily note    Functional Impairments:     []Noted cervical/thoracic/GHJ joint hypomobility   []Noted cervical/thoracic/GHJ joint hypermobility   [x]Decreased cervical/UE functional ROM   []Noted Headache pain aggravated by neck movements with/without dizziness   [x]Abnormal reflexes/sensation/myotomal/dermatomal deficits   []Decreased DCF control or ability to hold head up   []Decreased RC/scapular/core strength and neuromuscular control    []Decreased UE functional strength   []other:      Functional Activity Limitations (from functional questionnaire and intake)   [x]Reduced ability to tolerate prolonged functional positions   [x]Reduced ability or difficulty with changes of positions or transfers between positions   [x]Reduced ability to maintain good posture and demonstrate good body mechanics with sitting, bending, and lifting   [x] Reduced ability or tolerance with driving and/or computer work   [x]Reduced ability to perform lifting, reaching, carrying tasks   [x]Reduced ability to concentrate   [x]Reduced ability to sleep    [x]Reduced ability to tolerate any impact through UE or spine   [x]Reduced ability to ambulate prolonged functional periods/distances   []other:    Participation Restrictions   [x]Reduced participation in self care activities   [x]Reduced participation in home management activities   [x]Reduced participation in work activities   [x]Reduced participation in social activities. [x]Reduced participation in sport/recreational activities.     Classification/Subgrouping:   []signs/symptoms consistent with neck pain with mobility deficits     []signs/symptoms consistent with neck pain with movement coordinated impairments    [x]signs/symptoms consistent with neck pain with radiating pain    []signs/symptoms consistent with neck pain with headaches (cervicogenic)    []Signs/symptoms consistent with nerve root involvement including myotome & dermatome dysfunction   []sign/symptoms consistent with facet dysfunction of cervical and thoracic spine    []signs/symptoms consistent suggesting central cord compression/UMN syndromes   []signs/symptoms consistent with discogenic cervical pain   []signs/symptoms consistent with rib dysfunction   []signs/symptoms consistent with postural dysfunction   []signs/symptoms consistent with shoulder pathology    []signs/symptoms consistent with post-surgical status including decreased ROM, strength and function. []signs/symptoms consistent with pathology which may benefit from Dry Needling   []signs/symptoms which may limit the use of advanced manual therapy techniques: (Elevated CV risk profile, recent trauma, intolerance to end range positions, prior TIA, visual issues, UE neurological compromise )     Prognosis/Rehab Potential:      []Excellent   [x]Good    []Fair   []Poor    Tolerance of evaluation/treatment:    []Excellent   [x]Good    [x]Fair   []Poor    Physical Therapy Evaluation Complexity Justification  [x] A history of present problem with:  [] no personal factors and/or comorbidities that impact the plan of care;  [x]1-2 personal factors and/or comorbidities that impact the plan of care  []3 personal factors and/or comorbidities that impact the plan of care  [x] An examination of body systems using standardized tests and measures addressing any of the following: body structures and functions (impairments), activity limitations, and/or participation restrictions;:  [] a total of 1-2 or more elements   [x] a total of 3 or more elements   [] a total of 4 or more elements   [x] A clinical presentation with:  [x] stable and/or uncomplicated characteristics   [] evolving clinical presentation with changing characteristics  [] unstable and unpredictable characteristics;   [x] Clinical decision making of [x] low, [] moderate, [] high complexity using standardized patient assessment instrument and/or measurable assessment of functional outcome.     [x] EVAL (LOW) 35001 (typically 20 minutes face-to-face)  [] EVAL (MOD) 10005 (typically 30 minutes face-to-face)  [] EVAL (HIGH) 61717 (typically 45 minutes face-to-face)  [] RE-EVAL     PLAN:   Frequency/Duration:  2 days per week for 8 Weeks:  Interventions:  [x]  Therapeutic exercise including: strength training, ROM, for cervical spine,scapula, core and Upper extremity, including postural re-education. [x]  NMR activation and proprioception for Deep cervical flexors, periscapular and RC muscles and Core, including postural re-education. [x]  Manual therapy as indicated for C/T spine, ribs, Soft tissue to include: Dry Needling/IASTM, STM, PROM, Gr I-IV mobilizations, manipulation. [x] Modalities as needed that may include: thermal agents, E-stim, Biofeedback, US, iontophoresis as indicated  [x] Patient education on joint protection, postural re-education, activity modification, progression of HEP. HEP instruction: See daily note (see scanned forms)      GOALS:  Patient stated goal: Get better  [] Progressing: [] Met: [] Not Met: [] Adjusted    Therapist goals for Patient:   Short Term Goals: To be achieved in: 2 weeks  1. Independent in HEP and progression per patient tolerance, in order to prevent re-injury. [] Progressing: [] Met: [] Not Met: [] Adjusted  2. Patient will have a decrease in pain to facilitate improvement in movement, function, and ADLs as indicated by Functional Deficits. [] Progressing: [] Met: [] Not Met: [] Adjusted    Long Term Goals: To be achieved in: 8 weeks  1. Increase FOTO functional outcome score from 45 to 22  to assist with reaching prior level of function. [] Progressing: [] Met: [] Not Met: [] Adjusted  2. Patient will demonstrate [facility] for cervical spine without pain  [] Progressing: [] Met: [] Not Met: [] Adjusted  3. Patient will demonstrate an increase in strength in left UE to 5/5 in all myotomes and no dermatomal deficits in left UE   [] Progressing: [] Met: [] Not Met: [] Adjusted  4.  Patient will return to normal daily and work functional activities without increased symptoms or restriction. Pain <3/10 and no radicular symptoms  [] Progressing: [] Met: [] Not Met: [] Adjusted  5. Pt will report <3/10 pain and no radicular symptoms (patient specific functional goal)    [] Progressing: [] Met: [] Not Met: [] Adjusted         Electronically signed by:  Madiha Wood PT      Note: If patient does not return for scheduled/recommended follow up visits, this note will serve as a discharge from care along with the most recent update on progress.

## 2023-03-08 RX ORDER — TIZANIDINE 4 MG/1
4 TABLET ORAL 4 TIMES DAILY PRN
Qty: 60 TABLET | Refills: 0 | Status: SHIPPED | OUTPATIENT
Start: 2023-03-08

## 2023-03-08 RX ORDER — GABAPENTIN 100 MG/1
100 CAPSULE ORAL 3 TIMES DAILY
Qty: 90 CAPSULE | Refills: 0 | Status: SHIPPED | OUTPATIENT
Start: 2023-03-08 | End: 2023-04-07

## 2023-03-13 ENCOUNTER — HOSPITAL ENCOUNTER (OUTPATIENT)
Dept: PHYSICAL THERAPY | Age: 43
Setting detail: THERAPIES SERIES
Discharge: HOME | End: 2023-03-13
Payer: COMMERCIAL

## 2023-03-13 ENCOUNTER — OFFICE VISIT (OUTPATIENT)
Dept: ORTHOPEDIC SURGERY | Age: 43
End: 2023-03-13
Payer: COMMERCIAL

## 2023-03-13 VITALS — WEIGHT: 180 LBS | RESPIRATION RATE: 16 BRPM | BODY MASS INDEX: 28.93 KG/M2 | HEIGHT: 66 IN

## 2023-03-13 DIAGNOSIS — E04.1 THYROID NODULE: ICD-10-CM

## 2023-03-13 DIAGNOSIS — M50.20 HNP (HERNIATED NUCLEUS PULPOSUS), CERVICAL: Primary | ICD-10-CM

## 2023-03-13 PROCEDURE — 97012 MECHANICAL TRACTION THERAPY: CPT

## 2023-03-13 PROCEDURE — 97110 THERAPEUTIC EXERCISES: CPT

## 2023-03-13 PROCEDURE — 99213 OFFICE O/P EST LOW 20 MIN: CPT | Performed by: PHYSICIAN ASSISTANT

## 2023-03-13 PROCEDURE — 97140 MANUAL THERAPY 1/> REGIONS: CPT

## 2023-03-13 RX ORDER — GABAPENTIN 300 MG/1
300 CAPSULE ORAL 3 TIMES DAILY
Qty: 90 CAPSULE | Refills: 0 | Status: SHIPPED | OUTPATIENT
Start: 2023-03-13 | End: 2023-04-12

## 2023-03-13 NOTE — FLOWSHEET NOTE
[address] [place] [address]  Phone: [phone]   Fax:     [phone]    Physical Therapy Treatment Note/ Progress Report:     Date:  3/13/2023    Patient Name:  Matthew Moncada    :  1980  MRN: [id number]    Pertinent Medical History:      Medical/Treatment Diagnosis Information:  Diagnosis: M50.30 (ICD-10-CM) - DDD (degenerative disc disease), cervical.  M54.12 (ICD-10-CM) - Cervical radiculopathy at C6  Treatment Diagnosis: Pain with radiating symptoms. Left UE weakness. Left UE paresthesias. Decrease cervical AROM    Insurance/Certification information:  PT Insurance Information: [employer] [place]- 20 visits, 25%, 0$. No Auth  Physician Information:  Referring Provider (secondary): CARLOS Burnett  Plan of care signed (Y/N):     Date of Patient follow up with Physician:      Progress Report: []  Yes  [x]  No     Date Range for reporting period:  Beginning: 3/6/2023  Ending:     Progress report due (10 Rx/or 30 days whichever is less): 59     Recertification due (POC duration/ or 90 days whichever is less): 23     Visit # Insurance/POC Allowable Auth Needed   2  []Yes    [x]No     Functional Outcomes Measure:    Date Assessed: at eval  Test: NDI  Score:45    Pain level:  6/10     HISTORY OF INJURY:  Patient stated complaint: Pt states about 3 months ago he woke up with a kink in his neck and then it became worse, he couldn't lay on his left side or flat on his back. States pain kept increasing, states then super bowl night he had numbness down his left arm to his hand. States ER didn't do much, has been taking steps since then. States numbness lasted about 2-3 hours. States he had MRI today. States he still gets some tingling in his left arm that goes to this 1st and 2nd finger but doesn't get numbness. States he has noticed left hand weakness.  States he gets pain in his left upper arm to his elbow in the muscle and around the back of his left shoulder blade and in his neck on the left. States he has been getting headaches the past two weeks, states he isn't getting much relief at all. States when he lifts his left arm over his head it helps the pain some. States the gabapentin is helping some with the pain. Pain 9-10/10    SUBJECTIVE:    3/13/23: Pt states he is doing a little better, the numbness in his upper and lower arm is mostly gone. States he still gets the numbness in his left thumb and index finger    OBJECTIVE:   Observation:   Test measurements:    3/13/23: Carpenter's: Negative    Assessment:  *seems to be C6 cervical radiculopathy causing symptoms-pt has some myotomal and dermatomal deficits  3/13/23: Tolerated tx well.    Plan:  Perform UE DTR and Hoffmans  Continue cervical traction  Cervical ROM and strengthening exericses    RESTRICTIONS/PRECAUTIONS:     Exercises/Interventions:   Therapeutic Ex (01915)  Min: Resistance/Repetitions Notes        Chin Tuck 8 x 10\" Stopped due to some radicular pain, even after changing technique   Upper Trap Stretch 2 x 30\" L    Cervical AROM Rotation x 10 B                        Manual Intervention (66348)  Min:     Cerv mobs/manip Cervical Sideglides  Cervical Traction Tingling gone in left UE after    Thoracic mobs/manip     CT manip     Rib mobilizations     STM Cervical Paraspinals         NMR re-education (08388)  Min:               Therapeutic Activity (66787)  Min:               Modalities  Min:      Cervical Traction X 10 min, 15#/9#         HEP     Cervical Rotation AROM  UT Stretch- Left  Chin Tuck  3/6/23               Other Therapeutic Activities:        Home Exercise Program:    Therapeutic Exercise and NMR EXR  [x] (22244) Provided verbal/tactile cueing for activities related to strengthening, flexibility, endurance, ROM  for improvements in cervical, postural, scapular, scapulothoracic and UE control with self care,  reaching, carrying, lifting, house/yardwork, driving/computer work.    [] (92997) Provided verbal/tactile cueing for activities related to improving balance, coordination, kinesthetic sense, posture, motor skill, proprioception  to assist with cervical, scapular, scapulothoracic and UE control with self care, reaching, carrying, lifting, house/yardwork, driving/computer work. Therapeutic Activities:    [x] (26051 or 71154) Provided verbal/tactile cueing for activities related to improving balance, coordination, kinesthetic sense, posture, motor skill, proprioception and motor activation to allow for proper function of cervical, scapular, scapulothoracic and UE control with self care, carrying, lifting, driving/computer work.      Home Exercise Program:    [x] (38060) Reviewed/Progressed HEP activities related to strengthening, flexibility, endurance, ROM of cervical, scapular, scapulothoracic and UE control with self care, reaching, carrying, lifting, house/yardwork, driving/computer work  [] (84545) Reviewed/Progressed HEP activities related to improving balance, coordination, kinesthetic sense, posture, motor skill, proprioception of cervical, scapular, scapulothoracic and UE control with self care, reaching, carrying, lifting, house/yardwork, driving/computer work      Manual Treatments:  PROM / STM / Oscillations-Mobs:  G-I, II, III, IV (PA's, Inf., Post.)  [x] (09576) Provided manual therapy to mobilize soft tissue/joints of cervical/CT, scapular GHJ and UE for the purpose of decreasing headache, modulating pain, promoting relaxation,  increasing ROM, reducing/eliminating soft tissue swelling/inflammation/restriction, improving soft tissue extensibility and allowing for proper ROM for normal function with self care, reaching, carrying, lifting, house/yardwork, driving/computer work    If Kev Burger Please Indicate Time In/Out  CPT Code Time in Time out                                   Approval Dates:  CPT Code Units Approved Units Used  Date Updated:                     Charges:  Timed Code Treatment Minutes: 40   Total Treatment Minutes: 55     [] EVAL (LOW) 07789 (typically 20 minutes face-to-face)  [] EVAL (MOD) 03292 (typically 30 minutes face-to-face)  [] EVAL (HIGH) 87639 (typically 45 minutes face-to-face)  [] RE-EVAL     [x] CX(95602) x     [] Dry needle 1 or 2 Muscles (72970)  [] NMR (59089) x     [] Dry needle 3+ Muscles (33763)  [x] Manual (19739) x 2    [] Ultrasound (00708) x  [] TA (37982) x     [x] Mech Traction (55557)  [] ES(attended) (75526)     [] ES (un) (61463):   [] Vasopump (15101) [] Ionto (23918)   [] Other:    Anita Delarosa stated goal: Get better  [] Progressing: [] Met: [] Not Met: [] Adjusted     Therapist goals for Patient:   Short Term Goals: To be achieved in: 2 weeks  1. Independent in HEP and progression per patient tolerance, in order to prevent re-injury. [] Progressing: [] Met: [] Not Met: [] Adjusted  2. Patient will have a decrease in pain to facilitate improvement in movement, function, and ADLs as indicated by Functional Deficits. [] Progressing: [] Met: [] Not Met: [] Adjusted     Long Term Goals: To be achieved in: 8 weeks  1. Increase FOTO functional outcome score from 45 to 22  to assist with reaching prior level of function. [] Progressing: [] Met: [] Not Met: [] Adjusted  2. Patient will demonstrate increased AROM to [facility] for cervical spine without pain  [] Progressing: [] Met: [] Not Met: [] Adjusted  3. Patient will demonstrate an increase in strength in left UE to 5/5 in all myotomes and no dermatomal deficits in left UE   [] Progressing: [] Met: [] Not Met: [] Adjusted  4. Patient will return to normal daily and work functional activities without increased symptoms or restriction. Pain <3/10 and no radicular symptoms  [] Progressing: [] Met: [] Not Met: [] Adjusted  5.  Pt will report <3/10 pain and no radicular symptoms (patient specific functional goal)    [] Progressing: [] Met: [] Not Met: [] Adjusted            ASSESSMENT:  See above    Treatment/Activity Tolerance:  [x] Patient tolerated treatment well [] Patient limited by fatique  [] Patient limited by pain  [] Patient limited by other medical complications  [] Other:     Overall Progression Towards Functional goals/ Treatment Progress Update:  [x] Patient is progressing as expected towards functional goals listed. [] Progression is slowed due to complexities/Impairments listed. [] Progression has been slowed due to co-morbidities. [] Plan just implemented, too soon to assess goals progression <30days   [] Goals require adjustment due to lack of progress  [] Patient is not progressing as expected and requires additional follow up with physician  [] Other    Prognosis for POC: [x] Good [] Fair  [] Poor    Patient requires continued skilled intervention: [x] Yes  [] No        PLAN: See above  [x] Continue per plan of care [] Alter current plan (see comments)  [] Plan of care initiated [] Hold pending MD visit [] Discharge    Electronically signed by: Inocencia Townsend, PT   Inocencia Townsend PT, DPT, OMT-C    Note: If patient does not return for scheduled/recommended follow up visits, this note will serve as a discharge from care along with the most recent update on progress.

## 2023-03-13 NOTE — PROGRESS NOTES
Subjective:      Patient ID: Kevin Hamilton is a 43 y.o. male who is here for follow up evaluation of cervical radicular pain affecting left upper extremity. Denies any right arm pain. Recently underwent cervical spine MRI and is here today to review those test results. He reports mild improvement with medications including gabapentin which she has been taking 200 mg 3 times daily and tizanidine 4 mg every 8 hours as well as ibuprofen. Today pain is a 4/10 VAS. Review Of Systems:   He denies fever or chills. Reports weakness in left upper extremity. Denies gait or balance disturbance. History reviewed. No pertinent past medical history. History reviewed. No pertinent family history. History reviewed. No pertinent surgical history. Social History     Occupational History    Not on file   Tobacco Use    Smoking status: Every Day     Types: Cigarettes    Smokeless tobacco: Never   Substance and Sexual Activity    Alcohol use: Not on file    Drug use: Not on file    Sexual activity: Not on file       Current Outpatient Medications   Medication Sig Dispense Refill    gabapentin (NEURONTIN) 300 MG capsule Take 1 capsule by mouth 3 times daily for 30 days. 90 capsule 0    tiZANidine (ZANAFLEX) 4 MG tablet Take 1 tablet by mouth 4 times daily as needed (spasm) 60 tablet 0    ibuprofen (ADVIL;MOTRIN) 600 MG tablet Take 1 tablet by mouth every 8 hours as needed for Pain 90 tablet 1    lidocaine 4 % external patch Place 1 patch onto the skin daily 30 patch 0    methylPREDNISolone (MEDROL, ADAM,) 4 MG tablet Take by mouth. 6 po day one 5 po day 2 4 po day 3 3 po day 4 2 po day 5 1 po day 6. 1 kit 0     No current facility-administered medications for this visit. Objective:     He is alert, oriented x 3, pleasant, well nourished, developed and in no   acute distress.      Resp 16   Ht 5' 6\" (1.676 m)   Wt 180 lb (81.6 kg)   BMI 29.05 kg/m²      Cervical Spine Exam:  There is not deformity. There is  loss of motion. There is  muscular spasm. There is  trapezius/ rhomboid tenderness. There is not spinous process tenderness. Spurling Test is Positive. Upper extremities:  He has weakness 4/5 of the left upper extremity including left  strength testing, wrist extensor and shoulder abductor. DTR 2+ at the biceps, triceps and brachioradialis. He has no clonus and negative Carpenter's bilaterally. Negative Tinel's at the left wrist.  Negative Tinel's at the left elbow. Examination of the upper extremities shows intact perfusion to all extremities. He has no cyanosis and digigts are warm to touch, capillary refill is less than 2 seconds. He has no edema noted. He has intact skin without lacerations or abrasions, no significant erythema, rashes or skin lesions. X Rays: not performed in the office today:     MRI of the Cervical spine dated 3/7/2023 reviewed and discussed today. Narrative   Site: [facility] Rad #: [id number] #: [id number] Procedure: MR Cervical Spine w/o Contrast ; Reason for Exam: radicular pain   This document is confidential medical information. Unauthorized disclosure or use of this information is prohibited by law. If you are not the intended recipient of this document, please advise us by calling immediately [phone]. [address] [facility], [address] [place] [address]           Patient Name: Nicole Guevara   Case ID: [id number]   Patient : 1980   Referring Physician: CARLOS López   Exam Date: 2023   Exam Description: MR Cervical Spine w/o Contrast            HISTORY:  Neck pain radiating to left shoulder and upper extremity; numbness and tingling to    upper extremity. Three months. TECHNICAL FACTORS:  Long- and short-axis fat- and water-weighted images were performed. COMPARISON:  None.        FINDINGS:  Trace lateral curve convex left of low cervical/upper thoracic spine.  Mild kyphosis    is centered at C4 and C5. No inferior cerebellar tonsillar ectopia. Degenerative changes C1 anterior/odontoid. Bilevel moderate anterior mixed spondylotic disc displacements. C2-3: Trace central disc protrusion effaces ventral dural sac. No central canal stenosis. C3-4: Trace central disc protrusion effaces ventral dural sac. No central canal stenosis. C4-5: 5mm left paracentral disc protrusion deforms left ventral cord margin and subtly    posteriorly displaces left C5 nerve root. Mild spinal stenosis, AP spinal canal diameter at    9mm. C5 vertebral body is 10% height reduced; marrow signal reflects chronic status. C5-6: Disc height reduction; 3mm retrolisthesis; listhesed disc, asymmetric to right,    encroaches upon ventral dural sac. Borderline small spinal canal.       C6-7: Shows no compressive discopathy. No central canal stenosis. C7-T1: Shows no compressive discopathy. No central canal stenosis. No intrinsic cervical cord lesion. A few small nonpathologically enlarged lymph nodes are scattered at soft tissues of neck. 8mm slightly hyperintense T2 nodule at right thyroid lobe is present. CONCLUSION:   1. C4-5, moderate focal left paracentral protrusion gently flattens ventral cord margin and    subtly posteriorly displaces left C5 nerve root. Mild spinal stenosis. 2. C5-6, trace retrolisthesis; listhesed disc asymmetric to right encroaches upon ventral dural    sac. 3. 8mm right thyroid nodule. Correlate clinically. No further imaging is warranted. Thank you for the opportunity to provide your interpretation. Zbigniew Van MD       A: AUGUSTO/trina 03/07/2023 10:41 AM   T: TRINA 03/06/2023 10:07 PM       Diagnosis:       ICD-10-CM    1. HNP (herniated nucleus pulposus), cervical  M50.20 LOLIS Henderson MD, Pain Management, [facility]      2.  Thyroid nodule  E04.1 [facility] Roland Tilley MD, Otolaryngology, [facility]           Assessment and Plan:       Assessment:  Left arm pain felt to be cervical radicular nature most likely due to C4-C5 disc herniation resulting in foraminal stenosis. Incidental finding of a 8 mm thyroid nodule. Overall, his symptoms are starting to improve with medications and therapy. We will increase his gabapentin to 300 mg 3 times daily. New prescription provided today. I had an extensive discussion with Mr. Katherine Gutierrez regarding the natural history, etiology, and long term consequences of his condition. I have presented reasonable alternatives to the patient's proposed care, treatment, and services. Risks and benefits of the treatment options also reviewed in detail. I have outlined a treatment plan with them. He has had full opportunity to ask his questions. I have answered them all to his satisfaction. I feel that Mr. Katherine Gutierrez understands our discussion today. Plan:    Procedures-   At this time, I do not believe spinal surgery is indicated. He may benefit other therapeutic options such as epidural steroid injection, facet injection or other interventional procedures. For this reason, I am going to refer to Dr. Radha Alcantar for Interventional Pain Management for an evaluation and treatment. ENT referral provided today. Follow up-    Call or return to clinic if these symptoms worsen or fail to improve as anticipated. The total time spent on today's visit including reviewing test results, history, performance of physical exam, counseling/ education, ordering of medications, tests or procedures was 24 minutes. This time does include completion of the medical record. This time excludes any time spent performing procedures or tests in the office.                                                 Greta Horton PA-C   Senior Physician Assistant   [facility] Disclaimer: This note was generated with use of a verbal recognition program (DRAGON) and an attempt was made to check for errors. It is possible that there are still dictated errors within this office note. If so, please bring any significant errors to my attention for an addendum. All efforts were made to ensure that this office note is accurate.

## 2023-03-20 ENCOUNTER — HOSPITAL ENCOUNTER (OUTPATIENT)
Dept: PHYSICAL THERAPY | Age: 43
Setting detail: THERAPIES SERIES
Discharge: HOME | End: 2023-03-20
Payer: COMMERCIAL

## 2023-03-20 NOTE — FLOWSHEET NOTE
[address]  [place] [address]  Phone: [phone]   Fax:     [phone]    Physical Therapy  Cancellation/No-show Note  Patient Name:  Alicia Schafer  :  1980   Date:  2023  Cancelled visits to date: 1  No-shows to date: 0    Patient status for today's appointment patient:  [x]  Cancelled  []  Rescheduled appointment  []  No-show     Reason given by patient:  []  Patient ill  []  Conflicting appointment  []  No transportation    []  Conflict with work  [x]  No reason given  []  Other:     Comments:      Phone call information:   []  Phone call made today to patient at _ time at number provided:      []  Patient answered, conversation as follows:    []  Patient did not answer, message left as follows:  []  Phone call not made today    Electronically signed by:  Jack Quinteros, PT

## 2023-03-27 ENCOUNTER — OFFICE VISIT (OUTPATIENT)
Dept: ENT CLINIC | Age: 43
End: 2023-03-27
Payer: COMMERCIAL

## 2023-03-27 VITALS
HEART RATE: 105 BPM | BODY MASS INDEX: 28.77 KG/M2 | HEIGHT: 66 IN | DIASTOLIC BLOOD PRESSURE: 77 MMHG | SYSTOLIC BLOOD PRESSURE: 125 MMHG | WEIGHT: 179 LBS

## 2023-03-27 DIAGNOSIS — E04.1 THYROID NODULE: Primary | ICD-10-CM

## 2023-03-27 PROCEDURE — 99203 OFFICE O/P NEW LOW 30 MIN: CPT | Performed by: OTOLARYNGOLOGY

## 2023-03-27 NOTE — PROGRESS NOTES
no changes I do not think we need to continue to follow-up. - US THYROID; Future             I have performed a head and neck physical exam personally or was physically present during the key or critical portions of the service. This note was generated completely or in part utilizing Dragon dictation speech recognition software. Occasionally, words are mistranscribed and despite editing, the text may contain inaccuracies due to incorrect word recognition. If further clarification is needed please contact the office at [phone].

## 2023-03-29 RX ORDER — TIZANIDINE 4 MG/1
4 TABLET ORAL 4 TIMES DAILY PRN
Qty: 60 TABLET | Refills: 0 | Status: SHIPPED | OUTPATIENT
Start: 2023-03-29

## 2023-05-01 RX ORDER — TIZANIDINE 4 MG/1
4 TABLET ORAL 4 TIMES DAILY PRN
Qty: 60 TABLET | Refills: 0 | Status: SHIPPED | OUTPATIENT
Start: 2023-05-01

## 2023-05-17 RX ORDER — GABAPENTIN 300 MG/1
300 CAPSULE ORAL 3 TIMES DAILY
Qty: 90 CAPSULE | Refills: 0 | Status: SHIPPED | OUTPATIENT
Start: 2023-05-17 | End: 2023-06-16

## 2023-07-27 RX ORDER — TIZANIDINE 4 MG/1
4 TABLET ORAL 4 TIMES DAILY PRN
Qty: 60 TABLET | Refills: 0 | Status: SHIPPED | OUTPATIENT
Start: 2023-07-27

## 2023-07-27 RX ORDER — GABAPENTIN 300 MG/1
300 CAPSULE ORAL 3 TIMES DAILY
Qty: 90 CAPSULE | Refills: 0 | Status: SHIPPED | OUTPATIENT
Start: 2023-07-27 | End: 2023-08-26

## 2023-08-08 ENCOUNTER — HOSPITAL ENCOUNTER (EMERGENCY)
Age: 43
Discharge: HOME | End: 2023-08-08
Payer: COMMERCIAL

## 2023-08-08 ENCOUNTER — APPOINTMENT (OUTPATIENT)
Dept: CT IMAGING | Age: 43
End: 2023-08-08
Payer: COMMERCIAL

## 2023-08-08 VITALS
OXYGEN SATURATION: 97 % | BODY MASS INDEX: 28.81 KG/M2 | HEIGHT: 66 IN | HEART RATE: 84 BPM | WEIGHT: 179.23 LBS | RESPIRATION RATE: 16 BRPM | DIASTOLIC BLOOD PRESSURE: 68 MMHG | SYSTOLIC BLOOD PRESSURE: 123 MMHG | TEMPERATURE: 98.7 F
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
## 2023-08-08 DIAGNOSIS — S09.90XA INJURY OF HEAD, INITIAL ENCOUNTER: ICD-10-CM

## 2023-08-08 DIAGNOSIS — S01.01XA LACERATION OF SCALP, INITIAL ENCOUNTER: Primary | ICD-10-CM

## 2023-08-08 DIAGNOSIS — M50.30 DDD (DEGENERATIVE DISC DISEASE), CERVICAL: ICD-10-CM

## 2023-08-08 DIAGNOSIS — S06.0X9A CONCUSSION WITH LOSS OF CONSCIOUSNESS, INITIAL ENCOUNTER: ICD-10-CM

## 2023-08-08 PROCEDURE — 12002 RPR S/N/AX/GEN/TRNK2.6-7.5CM: CPT

## 2023-08-08 PROCEDURE — 70450 CT HEAD/BRAIN W/O DYE: CPT

## 2023-08-08 PROCEDURE — 99284 EMERGENCY DEPT VISIT MOD MDM: CPT

## 2023-08-08 PROCEDURE — 6370000000 HC RX 637 (ALT 250 FOR IP): Performed by: NURSE PRACTITIONER

## 2023-08-08 PROCEDURE — 72125 CT NECK SPINE W/O DYE: CPT

## 2023-08-08 RX ORDER — IBUPROFEN 600 MG/1
600 TABLET ORAL EVERY 8 HOURS PRN
Qty: 90 TABLET | Refills: 1 | Status: SHIPPED | OUTPATIENT
Start: 2023-08-08 | End: 2023-10-07

## 2023-08-08 RX ORDER — ACETAMINOPHEN 500 MG
1000 TABLET ORAL ONCE
Status: COMPLETED | OUTPATIENT
Start: 2023-08-08 | End: 2023-08-08

## 2023-08-08 RX ORDER — TIZANIDINE 4 MG/1
4 TABLET ORAL 4 TIMES DAILY PRN
Qty: 20 TABLET | Refills: 0 | Status: SHIPPED | OUTPATIENT
Start: 2023-08-08

## 2023-08-08 RX ORDER — METHOCARBAMOL 750 MG/1
750 TABLET, FILM COATED ORAL ONCE
Status: COMPLETED | OUTPATIENT
Start: 2023-08-08 | End: 2023-08-08

## 2023-08-08 RX ADMIN — METHOCARBAMOL TABLETS 750 MG: 750 TABLET, COATED ORAL at 17:21

## 2023-08-08 RX ADMIN — ACETAMINOPHEN 1000 MG: 500 TABLET ORAL at 17:20

## 2023-08-08 ASSESSMENT — PAIN DESCRIPTION - LOCATION
LOCATION: HEAD;NECK
LOCATION: NECK;HEAD

## 2023-08-08 ASSESSMENT — PAIN SCALES - GENERAL
PAINLEVEL_OUTOF10: 9
PAINLEVEL_OUTOF10: 9

## 2023-08-08 ASSESSMENT — PAIN DESCRIPTION - DESCRIPTORS
DESCRIPTORS: THROBBING
DESCRIPTORS: ACHING

## 2023-08-08 ASSESSMENT — PAIN - FUNCTIONAL ASSESSMENT: PAIN_FUNCTIONAL_ASSESSMENT: 0-10

## 2023-08-08 ASSESSMENT — LIFESTYLE VARIABLES
HOW OFTEN DO YOU HAVE A DRINK CONTAINING ALCOHOL: NEVER
HOW MANY STANDARD DRINKS CONTAINING ALCOHOL DO YOU HAVE ON A TYPICAL DAY: PATIENT DOES NOT DRINK

## 2023-08-08 NOTE — ED PROVIDER NOTES
noted, no nerve damage noted, no tendon damage noted and no underlying fracture noted      Contaminated: no    Treatment:     Area cleansed with:  Vannessa-Francoise    Amount of cleaning:  Standard    Debridement:  None    Undermining:  None  Skin repair:     Repair method:  Staples    Number of staples:  6  Approximation:     Approximation:  Close  Repair type:     Repair type:  Simple  Post-procedure details:     Dressing:  Open (no dressing)    Procedure completion:  Tolerated    FINAL IMPRESSION      1. Laceration of scalp, initial encounter    2. Injury of head, initial encounter    3. Fall, initial encounter    4. DDD (degenerative disc disease), cervical    5. Concussion with loss of consciousness, initial encounter          DISPOSITION/PLAN   DISPOSITION Decision To Discharge 08/08/2023 06:43:58 PM      PATIENT REFERRED TO:  [facility]  [address]  [phone]    For suture removal/staple removal: 7 days      DISCHARGE MEDICATIONS:  Discharge Medication List as of 8/8/2023  7:16 PM          (Please note that portions of this note werecompleted with a voice recognition program.  Efforts were made to edit the dictations but occasionally words are mis-transcribed.)    FIDELIA Roy CNP      This dictation was performed with a verbal recognition program (DRAGON) and it was checked for errors. It is possible that there are still dictated errors within this office note. If so, please bring any errors to my attention for an addendum. All efforts were made to ensure that this office note is accurate.        FIDELIA Carter CNP  08/08/23 1472

## 2023-08-08 NOTE — ED NOTES
Provider order placed for patient's discharge. Provider reviewed decision to discharge with the patient. Discharge paperwork and any prescriptions were reviewed with the patient. Patient verbalized understanding of discharge education and any prescriptions and has no further questions or further needs at this time. Patient left with all personal belongings and was stable upon departure. Patient thanked for choosing [facility] ([facility]) and informed to return should any need arise.        Pete Timmons RN  08/08/23 1334

## 2023-08-16 ENCOUNTER — HOSPITAL ENCOUNTER (EMERGENCY)
Age: 43
Discharge: HOME | End: 2023-08-16
Attending: STUDENT IN AN ORGANIZED HEALTH CARE EDUCATION/TRAINING PROGRAM
Payer: COMMERCIAL

## 2023-08-16 VITALS
TEMPERATURE: 98.4 F | HEART RATE: 102 BPM | HEIGHT: 66 IN | OXYGEN SATURATION: 97 % | RESPIRATION RATE: 18 BRPM | BODY MASS INDEX: 28.98 KG/M2 | DIASTOLIC BLOOD PRESSURE: 81 MMHG | SYSTOLIC BLOOD PRESSURE: 120 MMHG | WEIGHT: 180.34 LBS

## 2023-08-16 DIAGNOSIS — M62.838 NECK MUSCLE SPASM: Primary | ICD-10-CM

## 2023-08-16 DIAGNOSIS — Z48.02 REMOVAL OF STAPLE: ICD-10-CM

## 2023-08-16 PROCEDURE — 99283 EMERGENCY DEPT VISIT LOW MDM: CPT

## 2023-08-16 RX ORDER — LIDOCAINE 50 MG/G
1 PATCH TOPICAL DAILY
Qty: 10 PATCH | Refills: 0 | Status: SHIPPED | OUTPATIENT
Start: 2023-08-16 | End: 2023-08-26

## 2023-08-16 RX ORDER — LIDOCAINE 4 G/G
1 PATCH TOPICAL ONCE
Status: CANCELLED | OUTPATIENT
Start: 2023-08-16 | End: 2023-08-16

## 2023-08-16 ASSESSMENT — PAIN SCALES - GENERAL: PAINLEVEL_OUTOF10: 7

## 2023-08-16 ASSESSMENT — PAIN - FUNCTIONAL ASSESSMENT
PAIN_FUNCTIONAL_ASSESSMENT: 0-10
PAIN_FUNCTIONAL_ASSESSMENT: NONE - DENIES PAIN

## 2023-08-16 ASSESSMENT — PAIN DESCRIPTION - LOCATION: LOCATION: NECK

## 2023-08-16 ASSESSMENT — PAIN DESCRIPTION - ORIENTATION: ORIENTATION: RIGHT

## 2023-08-16 ASSESSMENT — LIFESTYLE VARIABLES: HOW OFTEN DO YOU HAVE A DRINK CONTAINING ALCOHOL: 2-4 TIMES A MONTH

## 2023-08-16 NOTE — DISCHARGE INSTRUCTIONS
You may take Tylenol and Motrin for pain control. Use a heating pad as directed. You may use Vaseline or triple antibiotic ointment to help with your scar and healing of your wound. Return if you develop any new or worsening symptoms.

## 2023-09-05 RX ORDER — GABAPENTIN 300 MG/1
300 CAPSULE ORAL 3 TIMES DAILY
Qty: 90 CAPSULE | Refills: 0 | OUTPATIENT
Start: 2023-09-05 | End: 2023-10-05

## 2023-09-05 RX ORDER — GABAPENTIN 300 MG/1
300 CAPSULE ORAL 3 TIMES DAILY
Qty: 90 CAPSULE | Refills: 0 | Status: SHIPPED | OUTPATIENT
Start: 2023-09-05 | End: 2023-10-05

## 2023-09-05 NOTE — TELEPHONE ENCOUNTER
Controlled substances monitoring: possible medication side effects, risk of tolerance and/or dependence, and alternative treatments discussed, no signs of potential drug abuse or diversion identified, and OARRS report reviewed today- activity consistent with treatment plan. Refill Authorized today. He will need follow up prior to any additional refills.

## 2023-10-26 RX ORDER — GABAPENTIN 300 MG/1
300 CAPSULE ORAL 3 TIMES DAILY
Qty: 90 CAPSULE | Refills: 0 | Status: SHIPPED | OUTPATIENT
Start: 2023-10-26 | End: 2023-11-25

## 2023-11-29 RX ORDER — GABAPENTIN 300 MG/1
300 CAPSULE ORAL 3 TIMES DAILY
Qty: 90 CAPSULE | Refills: 0 | Status: SHIPPED | OUTPATIENT
Start: 2023-11-29 | End: 2023-12-29 | Stop reason: SDUPTHER

## 2023-11-29 NOTE — TELEPHONE ENCOUNTER
Controlled substances monitoring: possible medication side effects, risk of tolerance and/or dependence, and alternative treatments discussed and no signs of potential drug abuse or diversion identified.     Refill x 1 month.  He needs to follow up before any further refills.   Last OV 3/13/23

## 2024-01-02 RX ORDER — GABAPENTIN 300 MG/1
300 CAPSULE ORAL 3 TIMES DAILY
Qty: 90 CAPSULE | Refills: 0 | Status: SHIPPED | OUTPATIENT
Start: 2024-01-02 | End: 2024-02-01

## 2024-02-03 RX ORDER — GABAPENTIN 300 MG/1
300 CAPSULE ORAL 3 TIMES DAILY
Qty: 90 CAPSULE | Refills: 0 | OUTPATIENT
Start: 2024-02-03 | End: 2024-03-04

## 2024-02-15 RX ORDER — GABAPENTIN 300 MG/1
300 CAPSULE ORAL 3 TIMES DAILY
Qty: 90 CAPSULE | Refills: 0 | Status: SHIPPED | OUTPATIENT
Start: 2024-02-15 | End: 2024-03-16

## 2024-03-25 RX ORDER — GABAPENTIN 300 MG/1
300 CAPSULE ORAL 3 TIMES DAILY
Qty: 90 CAPSULE | Refills: 2 | Status: SHIPPED | OUTPATIENT
Start: 2024-03-25 | End: 2024-06-23

## 2024-03-25 RX ORDER — GABAPENTIN 300 MG/1
300 CAPSULE ORAL 3 TIMES DAILY
Qty: 90 CAPSULE | Refills: 0 | OUTPATIENT
Start: 2024-03-25 | End: 2024-04-24

## 2024-03-25 NOTE — TELEPHONE ENCOUNTER
Rx request has been e-prescribed.  OK to have patient  Rx at Pharmacy    The current treatment regimen is needed to decrease the patient's pain symptoms, improve the quality of life and ability to function and improve sleep and mood symptoms.  Controlled substances monitoring:  I have discussed risk and benefits of the medication including potential for addiction and risk of overdose and responsibility to safely store and appropriately dispose of medications. he states the medications and treatment have helped improve the quality of life and helped in the psycho-social functioning.  There are no indicators for possible drug abuse, addiction or diversion problems. No signs of potential drug abuse or diversion identified, and OARRS report reviewed today- activity consistent with treatment plan. Risks and benefits of the medications and other alternative treatments were discussed with the patient. Opioid pain medication use presents serious risks, including but not limited to development of opioid use disorder,  addiction, abuse, misuse, overdose, respiratory depression and even death. The common side effects of these medications were also explained to the patient. Informed verbal consent was obtained.    Education provided on taking opioids with other sedative/hypnotics such as Benzodiazepines, the side effects can be dangerous as hypnotics/sedatives intensify the effects of opioids, which can lead to respiratory depression, this in turn increases over dose fatality in addition to impairing cognitive functioning. This is a concern especially with pre-existing condition such as emphysema. he verbalized understanding.     Education provided on consuming alcohol while on narcotics as the side effects can be dangerous as opiates intensify the effects of alcohol, which can lead to central nervous depression, confusion or delirium. he verbalized understanding.

## 2024-06-17 RX ORDER — GABAPENTIN 300 MG/1
300 CAPSULE ORAL 3 TIMES DAILY
Qty: 90 CAPSULE | Refills: 2 | Status: SHIPPED | OUTPATIENT
Start: 2024-06-17 | End: 2024-09-15

## 2024-07-18 RX ORDER — GABAPENTIN 300 MG/1
300 CAPSULE ORAL 3 TIMES DAILY
Qty: 90 CAPSULE | Refills: 2 | Status: SHIPPED | OUTPATIENT
Start: 2024-07-18 | End: 2024-10-16